# Patient Record
Sex: FEMALE | Race: WHITE
[De-identification: names, ages, dates, MRNs, and addresses within clinical notes are randomized per-mention and may not be internally consistent; named-entity substitution may affect disease eponyms.]

---

## 2018-05-30 ENCOUNTER — HOSPITAL ENCOUNTER (INPATIENT)
Dept: HOSPITAL 5 - TRG | Age: 38
LOS: 3 days | Discharge: HOME | End: 2018-06-02
Attending: OBSTETRICS & GYNECOLOGY | Admitting: OBSTETRICS & GYNECOLOGY
Payer: MEDICAID

## 2018-05-30 DIAGNOSIS — Z3A.39: ICD-10-CM

## 2018-05-30 DIAGNOSIS — A60.00: ICD-10-CM

## 2018-05-30 DIAGNOSIS — Z88.0: ICD-10-CM

## 2018-05-30 DIAGNOSIS — Z88.1: ICD-10-CM

## 2018-05-30 DIAGNOSIS — J45.909: ICD-10-CM

## 2018-05-30 LAB
HCT VFR BLD CALC: 36 % (ref 30.3–42.9)
HGB BLD-MCNC: 11.5 GM/DL (ref 10.1–14.3)
MCH RBC QN AUTO: 25 PG (ref 28–32)
MCHC RBC AUTO-ENTMCNC: 32 % (ref 30–34)
MCV RBC AUTO: 80 FL (ref 79–97)
PLATELET # BLD: 319 K/MM3 (ref 140–440)
RBC # BLD AUTO: 4.53 M/MM3 (ref 3.65–5.03)

## 2018-05-30 PROCEDURE — 86900 BLOOD TYPING SEROLOGIC ABO: CPT

## 2018-05-30 PROCEDURE — 99211 OFF/OP EST MAY X REQ PHY/QHP: CPT

## 2018-05-30 PROCEDURE — 85014 HEMATOCRIT: CPT

## 2018-05-30 PROCEDURE — 86850 RBC ANTIBODY SCREEN: CPT

## 2018-05-30 PROCEDURE — 85018 HEMOGLOBIN: CPT

## 2018-05-30 PROCEDURE — G0463 HOSPITAL OUTPT CLINIC VISIT: HCPCS

## 2018-05-30 PROCEDURE — 96374 THER/PROPH/DIAG INJ IV PUSH: CPT

## 2018-05-30 PROCEDURE — 85027 COMPLETE CBC AUTOMATED: CPT

## 2018-05-30 PROCEDURE — 86592 SYPHILIS TEST NON-TREP QUAL: CPT

## 2018-05-30 PROCEDURE — 36415 COLL VENOUS BLD VENIPUNCTURE: CPT

## 2018-05-30 PROCEDURE — 86901 BLOOD TYPING SEROLOGIC RH(D): CPT

## 2018-05-31 LAB
HCT VFR BLD CALC: 31.7 % (ref 30.3–42.9)
HGB BLD-MCNC: 10.1 GM/DL (ref 10.1–14.3)

## 2018-05-31 PROCEDURE — 3E0R3BZ INTRODUCTION OF ANESTHETIC AGENT INTO SPINAL CANAL, PERCUTANEOUS APPROACH: ICD-10-PCS | Performed by: OBSTETRICS & GYNECOLOGY

## 2018-05-31 PROCEDURE — 00HU33Z INSERTION OF INFUSION DEVICE INTO SPINAL CANAL, PERCUTANEOUS APPROACH: ICD-10-PCS | Performed by: OBSTETRICS & GYNECOLOGY

## 2018-05-31 RX ADMIN — IBUPROFEN SCH MG: 600 TABLET, FILM COATED ORAL at 12:09

## 2018-05-31 RX ADMIN — DOCUSATE SODIUM SCH MG: 100 CAPSULE, LIQUID FILLED ORAL at 21:43

## 2018-05-31 RX ADMIN — DOCUSATE SODIUM SCH MG: 100 CAPSULE, LIQUID FILLED ORAL at 11:05

## 2018-05-31 RX ADMIN — Medication SCH EACH: at 11:05

## 2018-05-31 RX ADMIN — IBUPROFEN SCH MG: 600 TABLET, FILM COATED ORAL at 23:36

## 2018-05-31 NOTE — PROCEDURE NOTE
OB Delivery Note





- Delivery


Date of Delivery: 18


Surgeon: THUAN RODRIGUEZ


Estimated blood loss: 100cc





- Vaginal


Delivery presentation: vertex


Delivery position: OA


Intrapartum events: none


Delivery induction: none


Delivery augmentation: pitocin


Delivery monitor: external FHT, external uterine


Route of delivery: 


Delivery placenta: spontaneous


Delivery cord: nuchal cord, 3 umbilical vessels


Episiotomy: none


Delivery laceration: none


Anesthesia: epidural


Delivery comments: 





Viable female  delivered at 4:43 AM over an intact perineum.  Apgars 

were 8 and 9 weight was 6 lbs. 15 oz.  A tight nuchal cord was clamped and cut 

on the perineum infant was then delivered stimulated and handed to the patient 

on her abdomen.  Placenta was delivered spontaneously and intact with a three-

vessel cord.  There were no lacerations noted.  Estimated blood loss 100 mL.  

The patient tolerated the procedure well.  There was excellent hemostasis.





- Infant


  ** A


Apgar at 1 minute: 8


Apgar at 5 minutes: 9


Infant Gender: Female (weight 6 pounds 15 ounces)

## 2018-05-31 NOTE — HISTORY AND PHYSICAL REPORT
History of Present Illness


Date of examination: 18


Date of admission: 


18 10:28





Chief complaint: 





I'm having contractions


History of present illness: 





Ms. Gan is a 38-year-old  2 para 1 who presents with a complaint of 

contractions every 5 minutes.  Her EDC is 2013 which makes her 39 and 4 

on today .Her prenatal course has been uncomplicated except for advanced 

maternal age.  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  She is GBS 

negative.





Past History


Past Medical History: asthma


Past Surgical History: no surgical history


GYN History: herpes


Family/Genetic History: none


Social history: 





- Obstetrical History


Expected Date of Delivery: 18


Actual Gestation: 39 Week(s) 5 Day(s) 


: 3


Para: 1


Number of Living Children: 1





Medications and Allergies


 Allergies











Allergy/AdvReac Type Severity Reaction Status Date / Time


 


amoxicillin Allergy Unknown Itching Verified 18 18:22


 


Penicillins AdvReac Severe Itching Verified 18 11:31











 Home Medications











 Medication  Instructions  Recorded  Confirmed  Last Taken  Type


 


Prenatal Formula Tablet 1 tab PO QAM 18 10:00 History











Active Meds: 


Active Medications





Butorphanol Tartrate (Stadol)  2 mg IV Q2H PRN


   PRN Reason: Pain , Severe (7-10)


   Last Admin: 18 23:37 Dose:  2 mg


Ephedrine Sulfate (Ephedrine Sulfate)  10 mg IV Q2M PRN


   PRN Reason: Hypotension


   Last Admin: 18 02:30 Dose:  10 mg


Fentanyl (Sublimaze)  100 mcg IV Q2H PRN


   PRN Reason: Labor Pain


Lactated Ringer's (Lactated Ringers)  1,000 mls @ 125 mls/hr IV AS DIRECT ANDRÉS


   Last Admin: 18 18:52 Dose:  125 mls/hr


Oxytocin/Sodium Chloride (Pitocin/Ns 20 Unit/1000ml Drip)  20 units in 1,000 

mls @ 125 mls/hr IV AS DIRECT ANDRÉS


   Last Admin: 18 04:55 Dose:  125 mls/hr


Oxytocin/Sodium Chloride (Pitocin/Ns 30 Unit/500ml)  30 units in 500 mls @ 4 mls

/hr IV TITR ANDRÉS; Protocol


   Last Titration: 18 23:40 Dose:  8 mls/hr, 8 mls/hr


Fentanyl/Bupivacaine/Sodium Chlor (Fentanyl-Bupiv 2 Mcg/Ml-0.125%)  200 mcg in 

100 mls @ 12 mls/hr EPIDURAL TITR ANDRÉS; Protocol


   Last Admin: 18 02:12 Dose:  12 mls/hr


Mineral Oil (Mineral Oil)  30 ml PO QHS PRN


   PRN Reason: Constipation


   Last Admin: 18 04:53 Dose:  30 ml


Naloxone HCl (Narcan 2 Mg/2 Ml)  0.2 mg IV Q5M PRN


   PRN Reason: Respiratory sedation


Ondansetron HCl (Zofran)  4 mg IV Q8H PRN


   PRN Reason: Nausea And Vomiting


Terbutaline Sulfate (Brethine)  0.25 mg SUB-Q ONCE PRN


   PRN Reason: Hyperstimulation/Hypertonicity


Terbutaline Sulfate (Brethine)  0.25 mg IVP ONCE PRN


   PRN Reason: Hyperstimulation/Hypertonicity











Review of Systems


All systems: negative


Constitutional: weight gain


Respiratory: cough


Gastrointestinal: heartburn, indigestion


Genitourinary: pelvic pain, contractions





- Vital Signs


Vital signs: 


 Vital Signs











Pulse Pulse Ox


 


 89   98 


 


 18 10:41  18 10:41








 











Temp Pulse Resp BP Pulse Ox


 


 98.7 F   87   20   149/82   97 


 


 18 03:00  18 05:25  18 03:00  18 05:18  18 05:25














- Physical Exam


Breasts: 


Cardiovascular: Regular rate, Normal S1, Normal S2


Lungs: Positive: Clear to auscultation, Normal air movement


Abdomen: Positive: normal appearance, soft, normal bowel sounds.  Negative: 

distention, tenderness


Vulva: both: normal


Vagina: Positive: normal moisture.  Negative: discharge


Cervix: Negative: lesion, discharge


Uterus: Positive: normal size, normal contour


Adnexa: both: normal


Anus/Rectum: Positive: normal perianal skin, heme negative.  Negative: rectal 

mass, hemorrhoids


Extremities: 


Deep Tendon Reflex Grade: Normal +2





- Obstetrical


FHR: auscultation normal


Cervical Dilatation: 4


Cervical Effacement Percentage: 80


Fetal station: 3


Uterine Contraction Pattern: Regular


Uterine Contraction Intensity: Moderate





Results


Result Diagrams: 


 18 17:40





 Abnormal lab results











  05/30/18 Range/Units





  17:40 


 


WBC  16.8 H  (4.5-11.0)  K/mm3


 


MCH  25 L  (28-32)  pg


 


RDW  16.0 H  (13.2-15.2)  %








All other labs normal.








Assessment and Plan





IUP at 39 and 5 in active labor.  Will admit for labor management.  We'll AROM 

when able.  Anticipate .

## 2018-06-01 RX ADMIN — DOCUSATE SODIUM SCH MG: 100 CAPSULE, LIQUID FILLED ORAL at 10:49

## 2018-06-01 RX ADMIN — Medication SCH EACH: at 10:49

## 2018-06-01 RX ADMIN — IBUPROFEN SCH: 600 TABLET, FILM COATED ORAL at 05:05

## 2018-06-01 RX ADMIN — IBUPROFEN SCH MG: 600 TABLET, FILM COATED ORAL at 14:27

## 2018-06-01 NOTE — DISCHARGE SUMMARY
Providers





- Providers


Date of Admission: 


18 10:28





Date of discharge: 18


Attending physician: 


THUAN RODRIGUEZ





Primary care physician: 


THUAN RODRIGUEZ








Hospitalization


Reason for admission: active labor


Delivery: 


Episiotomy: none


Laceration: none


Other postpartum procedures: none


Postpartum complications: none


Discharge diagnosis: IUP at term delivered


 baby: female


Condition at discharge: Good


Disposition: DC-01 TO HOME OR SELFCARE





Plan





- Discharge Medications


Prescriptions: 


Ibuprofen [Motrin] 800 mg PO Q8HR PRN #40 tablet


 PRN Reason: Pain





- Provider Discharge Summary


Activity: routine, no sex for 6 weeks, no heavy lifting 4 weeks, no strenuous 

exercise


Diet: routine


Instructions: routine


Additional instructions: 


[]  Smoking cessation referral if applicable(refer to patient education folder 

for contact #)


[]  Refer to [facility] Booklet








Call your doctor immediately for:


* Fever > 100.5


* Heavy vaginal bleeding ( >1 pad per hour)


* Severe persistent headache


* Shortness of breath


* Reddened, hot, painful area to leg or breast


* Drainage or odor from incision.





* Keep incision clean and dry at all times and follow doctor's instructions 

regarding bathing/showering











- Follow up plan


Follow up: 


THUAN RODRIGUEZ MD [Primary Care Provider] - 6 Weeks

## 2018-06-01 NOTE — PROGRESS NOTE
Assessment and Plan





PPD 1 s/p . doing well. Plan for discharge on today.





Subjective





- Subjective


Date of service: 18


Interval history: 





Ms. Gan is a 38-year-old  2 para 1 who presents with a complaint of 

contractions every 5 minutes.  Her EDC is 2013 which makes her 40 and 4 

on today .Her prenatal course has been uncomplicated except for advanced 

maternal age.  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  She is GBS 

negative.


Patient reports: appetite normal, voiding normally, pain well controlled, 

ambulating normally


[place]: doing well





Objective





- Vital Signs


Latest vital signs: 


 Vital Signs











  Temp Pulse Resp BP BP BP Pulse Ox


 


 18 08:23  97.7 F  74    115/81  


 


 18 00:49  97.6 F  85  18  107/64    98


 


 18 00:10  98.2 F  98 H  20    122/73  96


 


 18 16:07  97.7 F  79  16  128/75    98


 


 18 12:12  97.6 F  78  20  115/67    97








 Intake and Output











 18





 22:59 06:59 14:59


 


Intake Total 440  


 


Balance 440  


 


Intake:   


 


  Oral 440  


 


Other:   


 


  Total, Intake Amount 240  


 


  # Voids   


 


    Void 1  


 


  # Bowel Movements 1  














- Exam


Cardiovascular: Present: Regular rate, Normal S1, Normal S2


Lungs: Present: Clear to auscultation, Normal air movement


Abdomen: Present: normal appearance, soft


Vulva: both: normal


Uterus: Present: normal, firm


Extremities: Present: normal


Incision: Present: normal, dry, intact

## 2018-06-02 VITALS — DIASTOLIC BLOOD PRESSURE: 86 MMHG | SYSTOLIC BLOOD PRESSURE: 130 MMHG

## 2018-06-02 RX ADMIN — IBUPROFEN SCH MG: 600 TABLET, FILM COATED ORAL at 11:45

## 2018-06-02 RX ADMIN — IBUPROFEN SCH MG: 600 TABLET, FILM COATED ORAL at 00:52

## 2018-06-02 RX ADMIN — DOCUSATE SODIUM SCH MG: 100 CAPSULE, LIQUID FILLED ORAL at 00:52

## 2018-06-02 RX ADMIN — IBUPROFEN SCH: 600 TABLET, FILM COATED ORAL at 11:33

## 2018-06-02 RX ADMIN — Medication SCH EACH: at 11:32

## 2018-06-02 RX ADMIN — DOCUSATE SODIUM SCH MG: 100 CAPSULE, LIQUID FILLED ORAL at 11:33

## 2018-06-02 RX ADMIN — IBUPROFEN SCH: 600 TABLET, FILM COATED ORAL at 06:44
